# Patient Record
Sex: MALE | Race: WHITE | URBAN - METROPOLITAN AREA
[De-identification: names, ages, dates, MRNs, and addresses within clinical notes are randomized per-mention and may not be internally consistent; named-entity substitution may affect disease eponyms.]

---
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
## 2022-04-07 ENCOUNTER — HISTORICAL (OUTPATIENT)
Dept: ADMINISTRATIVE | Facility: HOSPITAL | Age: 22
End: 2022-04-07

## 2022-04-24 VITALS
DIASTOLIC BLOOD PRESSURE: 70 MMHG | SYSTOLIC BLOOD PRESSURE: 130 MMHG | BODY MASS INDEX: 26.18 KG/M2 | HEIGHT: 75 IN | WEIGHT: 210.56 LBS

## 2022-05-01 NOTE — HISTORICAL [facility] CERNER
This is a historical note converted from Wing. Formatting and pictures may have been removed.  Please reference Wing for original formatting and attached multimedia. Chief Complaint  CPX  History of Present Illness  The patient is a?19 year old?male here today for a complete Wellness Physical exam, he is here today with his mother. The patient has?no acute complaints today. The patient also carries a diagnosis of?mentioned, which is assessed today. Exercise is reported as?high?and includes cardio and weights due to training for football and basketball at [employer]. Monitors diet on a daily basis. Previous documented weight at last office visit is?192.?He presents today to discuss immunizations as he was notified that he was due to for his second meningitis.  Review of Systems  Constitutional:?weight gain,?no weight loss,?no fatigue,?no fever,?no chills,?no weakness,?no trouble sleeping.  Eyes:?no vision loss/changes,?glasses or contacts,?no pain,?no redness,?no blurry or double vision,?no flashing lights,?no specks,?no glaucoma,?no cataracts.  Last eye exam:?within last year  Head:?no headache,?no head injury,?no neck pain.?  Neck:??no lumps,?no swollen glands,?no stiffness.  Ears:?no decreased hearing,?no ringing,?no earache,?no drainage.?  Nose:?no stuffiness,?no discharge,?no itching,?no hay fever,?no nosebleeds,?no sinus pain.  Throat:?no bleeding,?no dentures,?no sore tongue,?no dry mouth,?no sore throat,?no hoarseness,?no thrush,?no non-healing sores.  Cardiovascular:?no chest pain or discomfort,?no tightness,?no palpitations,?no SOB with activity,?no difficulty breathing while supine,?no swelling,?no sudden awakening from sleep with SOB.  Vascular:?no calf pain with walking,?no leg cramping.  Respiratory:??no cough,?no sputum,?no coughing up blood,?no SOB,?no wheezing,?no painful breathing.  Gastrointestinal:?no swallowing difficulty,?no heartburn,?no change in appetite,?no nausea,?no change in bowel habits,?no  rectal bleeding,?no constipation,?no diarrhea,?no yellow eyes or skin.  Urinary:?no frequency,?no urgency,?no burning or pain,?no blood in urine,?no incontinence,?no change in urinary strength.  Musculoskeletal:?no muscle or joint pain,?no stiffness,?no back pain,?no redness of joints,?no swelling of joints,?no trauma.  Skin:?no rashes,?no lumps,?no itching,?no dryness,?color normal for ethnicity,?no hair or nail changes.  Neurologic:?no dizziness,?no fainting,?no seizures,?no weakness,?no numbness,?no tingling,?no tremors.  Psychiatric:?no nervousness,?no stress,?no depression,?no memory loss.  Endocrine:?no heat or cold intolerance,?no sweating,?no frequent urination,?no thirst,?no change in appetite.  Hematologic:?no ease of bruising,?no ease of bleeding.  ?  Physical Exam  Vitals & Measurements  BP:?130/70?  HT:?190.00?cm? WT:?95.500?kg? BMI:?26.45?  General- In NAD, A&O x 4  ?   Eye- PERRL, EOMI  ?   HENT- TM/EAC clear, Nose mucosa WNL, No D/C, No Sinus Tenderness, O/P without erythema or exudates?  ?   Neck- S, No LA, No Thyromegaly, No bruits, No JVD  ?   Respiratory- CTA, No wheezing, No crackles, No rhonchi  ?   Cardiovascular- RRR W/O MGR, Pulses equal throughout  ?   Gastrointestinal- S, NT, No HSM, NABS, No masses, No peritoneal signs?  ?   Lymphatics- WNL  ?  Musculoskeletal- No tenderness, Joints WNL, FROM, Neg SLR, No CCE  ?  Integumentary- Warm, dry, intact, No lesions/rashes/hives  ?  Neurologic- No Motor/Sensory deficits, Reflexes +2 throughout, CN II-XII intact, Neg cerebellar tests  ?  Assessment/Plan  1.?Wellness examination?Z00.00  ?1. Wellness  - Health and Exercise educated upon  -10% weight loss goal  -Lifestyle counseling > 20 minutes  -Diet: Healthy choices  -Screening: UTD Eye Exam  -Vaccines: All vaccines UTD with exception of 2nd HPV, 2nd Menactra and Trumenba was also discussed- he would also like his flu vaccine today  -LabS: CBC/CMP/TSH- is not fasting this am? however they would  like some lab work drawn today  ?   2. Comorbidities- mentioned  ?   3. Referrals none at this time  ?   4. RTC in? 1 month for 2nd HPV- they will consider Trumenba at this time as well  Ordered:  CBC w/ Auto Diff, Routine collect, 10/22/20 9:24:00 CDT, Blood, Order for future visit, Stop date 10/22/20 9:24:00 CDT, Lab Collect, Wellness examination, 10/22/20 9:24:00 CDT  Clinic Follow up, *Est. 10/22/21 3:00:00 CDT, Order for future visit, Wellness examination, [facility]  Comprehensive Metabolic Panel, Routine collect, 10/22/20 9:24:00 CDT, Blood, Order for future visit, Stop date 10/22/20 9:24:00 CDT, Lab Collect, Wellness examination, 10/22/20 9:24:00 CDT  Lab Collection Request, 10/22/20 9:24:00 CDT, [facility], 10/22/20 9:24:00 CDT, Wellness examination  Preventative Health Care Est 12-17 years [id number] PC, Wellness examination, [facility], 10/22/20 9:23:00 CDT  Thyroid Stimulating Hormone, Routine collect, 10/22/20 9:24:00 CDT, Blood, Order for future visit, Stop date 10/22/20 9:24:00 CDT, Lab Collect, Wellness examination, 10/22/20 9:24:00 CDT  ?  2.?Need for vaccination?Z23  1. Flu and 2nd Menactra today- s/e reviewed- tolerated 1st injection well  Ordered:  meningococcal conjugate vaccine, 0.5 mL, form: Soln, IM, Once, first dose 10/22/20 9:35:00 CDT, stop date 10/22/20 9:35:00 CDT  ?  Orders:  Clinic Follow-up PRN, 10/22/20 9:24:00 CDT, [facility], Future Order  Referrals  Clinic Follow up, *Est. 10/22/21 3:00:00 CDT, Order for future visit, Wellness examination, [facility]  Clinic Follow-up PRN, 10/22/20 9:24:00 CDT, [facility], Future Order   Problem List/Past Medical History  Ongoing  No qualifying data  Historical  No qualifying data  Medications  Menactra intramuscular solution, 0.5 mL, IM, Once  Allergies  No Known Allergies  Social History  Alcohol - No Risk, 09/30/2015  Tobacco - No Risk, 09/30/2015  Never (less than 100 in lifetime), N/A, 06/06/2019  Never (less than 100 in  lifetime), N/A, 03/18/2019  Immunizations  Vaccine Date Status   influenza virus vaccine, inactivated 10/22/2020 Given   human papillomavirus vaccine 08/20/2014 Recorded   tetanus/diphtheria/pertussis, acel(Tdap) 08/20/2014 Recorded   meningococcal conjugate vaccine 08/20/2014 Recorded   influenza virus vaccine, inactivated 11/20/2013 Recorded   varicella virus vaccine 07/17/2007 Recorded   poliovirus vaccine, inactivated 07/17/2007 Recorded   measles/mumps/rubella virus vaccine 07/17/2007 Recorded   diphtheria/pertussis, acel/tetanus ped 07/17/2007 Recorded   diphtheria/pertussis, acel/tetanus ped 03/10/2004 Recorded   hepatitis B pediatric vaccine 03/10/2004 Recorded   varicella virus vaccine 12/11/2003 Recorded   measles/mumps/rubella virus vaccine 12/11/2003 Recorded   pneumococcal 7-valent vaccine 12/11/2003 Recorded   hepatitis B pediatric vaccine 09/03/2003 Recorded   poliovirus vaccine, inactivated 06/05/2003 Recorded   diphtheria/pertussis, acel/tetanus ped 06/05/2003 Recorded   pneumococcal 7-valent vaccine 06/05/2003 Recorded   hepatitis B pediatric vaccine 06/05/2003 Recorded   poliovirus vaccine, inactivated 04/08/2003 Recorded   diphtheria/pertussis, acel/tetanus ped 04/08/2003 Recorded   pneumococcal 7-valent vaccine 04/08/2003 Recorded   poliovirus vaccine, inactivated 02/05/2003 Recorded   diphtheria/pertussis, acel/tetanus ped 02/05/2003 Recorded   pneumococcal 7-valent vaccine 02/05/2003 Recorded   Health Maintenance  Health Maintenance  ???Pending?(in the next year)  ??? ??OverDue  ??? ? ? ?Adolescent Depression Screening due??01/01/20??and every 1??year(s)  ???Satisfied?(in the past 1 year)  ??? ??Satisfied?  ??? ? ? ?Body Mass Index Check on??10/22/20.??Satisfied by Romelia Rodriguez  ??? ? ? ?Influenza Vaccine on??10/22/20.??Satisfied by Romelia Rodriguez  ?      The?physician?is present within?the office with the?nurse practitioner.??The?office visit?documentation and management have been?reviewed  and agreed upon.? I will continue to follow?this patient along with?the nurse practitioner?for current and future care.